# Patient Record
Sex: MALE | Race: WHITE | NOT HISPANIC OR LATINO | Employment: FULL TIME | URBAN - NONMETROPOLITAN AREA
[De-identification: names, ages, dates, MRNs, and addresses within clinical notes are randomized per-mention and may not be internally consistent; named-entity substitution may affect disease eponyms.]

---

## 2023-03-23 DIAGNOSIS — I10 BENIGN ESSENTIAL HYPERTENSION: ICD-10-CM

## 2023-03-23 PROBLEM — G47.33 OSA ON CPAP: Status: ACTIVE | Noted: 2023-03-23

## 2023-03-23 PROBLEM — E78.5 HYPERLIPIDEMIA: Status: ACTIVE | Noted: 2023-03-23

## 2023-03-23 PROBLEM — S49.90XA SHOULDER INJURY: Status: ACTIVE | Noted: 2023-03-23

## 2023-03-23 PROBLEM — N52.9 ERECTILE DYSFUNCTION: Status: ACTIVE | Noted: 2023-03-23

## 2023-03-23 PROBLEM — E29.1 TESTICULAR HYPOFUNCTION: Status: ACTIVE | Noted: 2023-03-23

## 2023-03-23 PROBLEM — F41.9 ANXIETY: Status: ACTIVE | Noted: 2023-03-23

## 2023-03-23 PROBLEM — M25.511 ACUTE PAIN OF RIGHT SHOULDER: Status: ACTIVE | Noted: 2023-03-23

## 2023-03-23 RX ORDER — METOPROLOL SUCCINATE 100 MG/1
100 TABLET, EXTENDED RELEASE ORAL DAILY
COMMUNITY
End: 2023-03-23 | Stop reason: SDUPTHER

## 2023-03-23 RX ORDER — METOPROLOL SUCCINATE 100 MG/1
TABLET, EXTENDED RELEASE ORAL
Qty: 90 TABLET | Refills: 3 | Status: SHIPPED | OUTPATIENT
Start: 2023-03-23 | End: 2023-03-23 | Stop reason: SDUPTHER

## 2023-03-23 RX ORDER — METOPROLOL SUCCINATE 100 MG/1
100 TABLET, EXTENDED RELEASE ORAL DAILY
Qty: 90 TABLET | Refills: 3 | Status: SHIPPED | OUTPATIENT
Start: 2023-03-23 | End: 2024-02-13 | Stop reason: SDUPTHER

## 2023-07-07 RX ORDER — CIPROFLOXACIN 500 MG/1
TABLET ORAL
COMMUNITY
Start: 2023-01-03

## 2023-07-07 RX ORDER — TESTOSTERONE 100 MG
PELLET (EA) IMPLANTATION
COMMUNITY

## 2023-07-07 RX ORDER — ALPRAZOLAM 0.5 MG/1
TABLET ORAL
COMMUNITY

## 2023-07-07 RX ORDER — ANASTROZOLE 1 MG/1
TABLET ORAL
COMMUNITY
Start: 2015-07-10

## 2023-07-07 RX ORDER — AMLODIPINE BESYLATE 5 MG/1
5 TABLET ORAL DAILY
COMMUNITY
End: 2023-07-11

## 2023-07-07 RX ORDER — TADALAFIL 10 MG/1
TABLET ORAL
COMMUNITY
End: 2023-08-22 | Stop reason: SDUPTHER

## 2023-07-07 RX ORDER — LISINOPRIL 40 MG/1
40 TABLET ORAL DAILY
COMMUNITY
End: 2023-09-19

## 2023-07-11 ENCOUNTER — OFFICE VISIT (OUTPATIENT)
Dept: PRIMARY CARE | Facility: CLINIC | Age: 58
End: 2023-07-11
Payer: COMMERCIAL

## 2023-07-11 VITALS
OXYGEN SATURATION: 97 % | BODY MASS INDEX: 29.34 KG/M2 | WEIGHT: 204.5 LBS | RESPIRATION RATE: 14 BRPM | HEART RATE: 88 BPM | TEMPERATURE: 98.3 F | SYSTOLIC BLOOD PRESSURE: 113 MMHG | DIASTOLIC BLOOD PRESSURE: 73 MMHG

## 2023-07-11 DIAGNOSIS — E78.5 HYPERLIPIDEMIA, UNSPECIFIED HYPERLIPIDEMIA TYPE: ICD-10-CM

## 2023-07-11 DIAGNOSIS — F41.9 ANXIETY: ICD-10-CM

## 2023-07-11 DIAGNOSIS — I10 BENIGN ESSENTIAL HYPERTENSION: Primary | ICD-10-CM

## 2023-07-11 DIAGNOSIS — R73.02 IMPAIRED GLUCOSE TOLERANCE: ICD-10-CM

## 2023-07-11 DIAGNOSIS — G47.33 OSA ON CPAP: ICD-10-CM

## 2023-07-11 DIAGNOSIS — E29.1 TESTICULAR HYPOFUNCTION: ICD-10-CM

## 2023-07-11 PROCEDURE — 3074F SYST BP LT 130 MM HG: CPT | Performed by: FAMILY MEDICINE

## 2023-07-11 PROCEDURE — 99214 OFFICE O/P EST MOD 30 MIN: CPT | Performed by: FAMILY MEDICINE

## 2023-07-11 PROCEDURE — 1036F TOBACCO NON-USER: CPT | Performed by: FAMILY MEDICINE

## 2023-07-11 PROCEDURE — 3078F DIAST BP <80 MM HG: CPT | Performed by: FAMILY MEDICINE

## 2023-07-11 RX ORDER — AMLODIPINE BESYLATE 10 MG/1
10 TABLET ORAL DAILY
Qty: 90 TABLET | Refills: 3 | Status: SHIPPED | OUTPATIENT
Start: 2023-07-11 | End: 2024-02-13 | Stop reason: SDUPTHER

## 2023-07-11 ASSESSMENT — ENCOUNTER SYMPTOMS
SWEATS: 0
HYPERTENSION: 1
PALPITATIONS: 0
SHORTNESS OF BREATH: 0
BLURRED VISION: 0
HEADACHES: 0
ORTHOPNEA: 0
NECK PAIN: 0
PND: 0

## 2023-07-11 ASSESSMENT — PATIENT HEALTH QUESTIONNAIRE - PHQ9
1. LITTLE INTEREST OR PLEASURE IN DOING THINGS: NOT AT ALL
2. FEELING DOWN, DEPRESSED OR HOPELESS: NOT AT ALL
SUM OF ALL RESPONSES TO PHQ9 QUESTIONS 1 AND 2: 0

## 2023-07-11 ASSESSMENT — PAIN SCALES - GENERAL: PAINLEVEL: 0-NO PAIN

## 2023-07-11 NOTE — PROGRESS NOTES
Answers submitted by the patient for this visit:  High Blood Pressure Questionnaire (Submitted on 7/11/2023)  Chief Complaint: Hypertension  Chronicity: recurrent  Onset: more than 1 year ago  Progression since onset: unchanged  Condition status: controlled  anxiety: No  blurred vision: No  chest pain: No  headaches: No  malaise/fatigue: No  neck pain: No  orthopnea: No  palpitations: No  peripheral edema: No  PND: No  shortness of breath: No  sweats: No  CAD risks: family history  Compliance problems: diet

## 2023-07-11 NOTE — PROGRESS NOTES
Subjective   Patient ID: Norberto Urbina is a 58 y.o. male who presents for Hypertension.    HPI   Amrik was seen for a routine follow-up of his hypertension, for which he takes metoprolol, lisinopril, amlodipine.  He brought a list of home blood pressures, which range from 120 systolic, 70s to upper 80s diastolic.  Averages in the 130s, 80-85 diastolic.  Medication(s) are being taken and tolerated as prescribed, without concerns, list reconciled today.  There are no complaints of chest pain, shortness of breath, lower extremity edema, or exertional concerns  Labs from his wellness physician will be scanned into the chart, total cholesterol is 183, HDL 38, , triglycerides normal.  Fasting sugar is 116, has been inching up over the last several years.  PSA is normal, as are his blood counts, kidney and liver function.  He overall feels well, has been as of late.  Home weight is around 195.  Review of Systems  The full, 10+ multi-organ review of systems, is within normal limits with the exception of what is noted above in HPI.  Objective   /73 (BP Location: Right arm, Patient Position: Sitting, BP Cuff Size: Adult)   Pulse 88   Temp 36.8 °C (98.3 °F) (Temporal)   Resp 14   Wt 92.8 kg (204 lb 8 oz)   SpO2 97%   BMI 29.34 kg/m²     Physical Exam  Constitutional/General appearance: alert, oriented, well-appearing, in no distress  Head and face exam is normal  No scleral icterus or conjunctival erythema present  Hearing is grossly normal  Respiratory effort is normal, no dyspnea noted  Cortical function is normal  Mood, affect, are pleasant, appropriate, and interactive.  Insight is normal  Cardiac exam reveals a regular rate and rhythm, no murmurs, rubs or gallops present.   Lungs are clear bilaterally.    No lower extremity edema present.    Assessment/Plan     Hypertension, would like slightly better control, given impaired glucose tolerance, mild hyperlipidemia, trace calcium on coronary artery CT  scan.  Continue metoprolol, lisinopril, increase amlodipine to 10 mg daily, watch for fluid retention side effects.    Continue checking labs every 6 months, per his wellness physician, bring copies of it and blood pressures as always, much appreciated.    Continue all other medications, including tadalafil, anastrozole, Testopel.  He takes Xanax extremely infrequently, CSA, UDS not needed.    Follow-up in 6 months

## 2023-08-22 DIAGNOSIS — N52.8 OTHER MALE ERECTILE DYSFUNCTION: ICD-10-CM

## 2023-08-22 RX ORDER — TADALAFIL 10 MG/1
TABLET ORAL
Qty: 10 TABLET | Refills: 2 | Status: SHIPPED | OUTPATIENT
Start: 2023-08-22 | End: 2024-02-22 | Stop reason: SDUPTHER

## 2023-09-18 DIAGNOSIS — I10 BENIGN ESSENTIAL HYPERTENSION: ICD-10-CM

## 2023-09-19 RX ORDER — LISINOPRIL 40 MG/1
40 TABLET ORAL DAILY
Qty: 90 TABLET | Refills: 1 | Status: SHIPPED | OUTPATIENT
Start: 2023-09-19 | End: 2024-02-13 | Stop reason: SDUPTHER

## 2024-01-11 ENCOUNTER — APPOINTMENT (OUTPATIENT)
Dept: PRIMARY CARE | Facility: CLINIC | Age: 59
End: 2024-01-11
Payer: COMMERCIAL

## 2024-01-18 ENCOUNTER — TELEPHONE (OUTPATIENT)
Dept: PRIMARY CARE | Facility: CLINIC | Age: 59
End: 2024-01-18

## 2024-02-13 ENCOUNTER — TELEPHONE (OUTPATIENT)
Dept: PRIMARY CARE | Facility: CLINIC | Age: 59
End: 2024-02-13
Payer: COMMERCIAL

## 2024-02-13 DIAGNOSIS — I10 BENIGN ESSENTIAL HYPERTENSION: ICD-10-CM

## 2024-02-13 DIAGNOSIS — N52.8 OTHER MALE ERECTILE DYSFUNCTION: ICD-10-CM

## 2024-02-13 RX ORDER — AMLODIPINE BESYLATE 10 MG/1
10 TABLET ORAL DAILY
Qty: 5 TABLET | Refills: 0 | Status: SHIPPED | OUTPATIENT
Start: 2024-02-13 | End: 2024-03-06 | Stop reason: SDUPTHER

## 2024-02-13 RX ORDER — LISINOPRIL 40 MG/1
40 TABLET ORAL DAILY
Qty: 5 TABLET | Refills: 0 | Status: SHIPPED | OUTPATIENT
Start: 2024-02-13 | End: 2024-03-06 | Stop reason: SDUPTHER

## 2024-02-13 RX ORDER — METOPROLOL SUCCINATE 100 MG/1
100 TABLET, EXTENDED RELEASE ORAL DAILY
Qty: 5 TABLET | Refills: 0 | Status: SHIPPED | OUTPATIENT
Start: 2024-02-13 | End: 2024-03-25

## 2024-02-13 NOTE — TELEPHONE ENCOUNTER
Pt called in on the refill line stating that he is out of town, and has forgotten his bp meds at home. Pt states that he would like a quantity of #5 of each of his bp meds to be sent to [facility] if able- pharmacy added to chart. Pt bp meds are lisinopril 40mg, metoprolol 100mg, and amlodipine 10mg. Please advise.

## 2024-02-23 RX ORDER — TADALAFIL 10 MG/1
TABLET ORAL
Qty: 10 TABLET | Refills: 2 | Status: SHIPPED | OUTPATIENT
Start: 2024-02-23

## 2024-03-06 ENCOUNTER — TELEPHONE (OUTPATIENT)
Dept: PRIMARY CARE | Facility: CLINIC | Age: 59
End: 2024-03-06

## 2024-03-06 DIAGNOSIS — I10 BENIGN ESSENTIAL HYPERTENSION: ICD-10-CM

## 2024-03-06 RX ORDER — AMLODIPINE BESYLATE 10 MG/1
10 TABLET ORAL DAILY
Qty: 90 TABLET | Refills: 3 | Status: SHIPPED | OUTPATIENT
Start: 2024-03-06 | End: 2025-03-01

## 2024-03-06 RX ORDER — LISINOPRIL 40 MG/1
40 TABLET ORAL DAILY
Qty: 90 TABLET | Refills: 3 | Status: SHIPPED | OUTPATIENT
Start: 2024-03-06 | End: 2025-03-06

## 2024-03-06 NOTE — TELEPHONE ENCOUNTER
Pt called through the answering service. Pt needs a med refill. Pt needs a refill on Amlodipine 10mg and Lisinopril 40mg. Pt only has 1 pill left and is going out of town. Pt uses [facility] in [place]. Pt's next ov 6/25/24.

## 2024-03-24 DIAGNOSIS — I10 BENIGN ESSENTIAL HYPERTENSION: ICD-10-CM

## 2024-03-25 RX ORDER — METOPROLOL SUCCINATE 100 MG/1
100 TABLET, EXTENDED RELEASE ORAL DAILY
Qty: 90 TABLET | Refills: 3 | Status: SHIPPED | OUTPATIENT
Start: 2024-03-25

## 2024-06-25 ENCOUNTER — APPOINTMENT (OUTPATIENT)
Dept: PRIMARY CARE | Facility: CLINIC | Age: 59
End: 2024-06-25
Payer: COMMERCIAL

## 2024-06-25 VITALS
HEART RATE: 71 BPM | DIASTOLIC BLOOD PRESSURE: 74 MMHG | TEMPERATURE: 97.9 F | OXYGEN SATURATION: 98 % | SYSTOLIC BLOOD PRESSURE: 118 MMHG | WEIGHT: 191 LBS | BODY MASS INDEX: 27.41 KG/M2

## 2024-06-25 DIAGNOSIS — Z91.038 HISTORY OF INSECT STING ALLERGY: Primary | ICD-10-CM

## 2024-06-25 DIAGNOSIS — N52.8 OTHER MALE ERECTILE DYSFUNCTION: ICD-10-CM

## 2024-06-25 DIAGNOSIS — E29.1 TESTICULAR HYPOFUNCTION: ICD-10-CM

## 2024-06-25 DIAGNOSIS — G47.33 OSA ON CPAP: ICD-10-CM

## 2024-06-25 DIAGNOSIS — I10 BENIGN ESSENTIAL HYPERTENSION: ICD-10-CM

## 2024-06-25 PROCEDURE — 1036F TOBACCO NON-USER: CPT | Performed by: FAMILY MEDICINE

## 2024-06-25 PROCEDURE — 99214 OFFICE O/P EST MOD 30 MIN: CPT | Performed by: FAMILY MEDICINE

## 2024-06-25 PROCEDURE — 3074F SYST BP LT 130 MM HG: CPT | Performed by: FAMILY MEDICINE

## 2024-06-25 PROCEDURE — 3078F DIAST BP <80 MM HG: CPT | Performed by: FAMILY MEDICINE

## 2024-06-25 RX ORDER — TADALAFIL 10 MG/1
TABLET ORAL
Qty: 30 TABLET | Refills: 2 | Status: SHIPPED | OUTPATIENT
Start: 2024-06-25

## 2024-06-25 RX ORDER — PREDNISONE 20 MG/1
40 TABLET ORAL DAILY
Qty: 10 TABLET | Refills: 0 | Status: SHIPPED | OUTPATIENT
Start: 2024-06-25 | End: 2024-06-30

## 2024-06-25 ASSESSMENT — ENCOUNTER SYMPTOMS
SHORTNESS OF BREATH: 0
ORTHOPNEA: 0
HEADACHES: 0
HYPERTENSION: 1
PALPITATIONS: 0
SWEATS: 0
BLURRED VISION: 0
PND: 0
NECK PAIN: 0

## 2024-06-25 NOTE — PROGRESS NOTES
Subjective   Patient ID: Norberto Urbina is a 59 y.o. male who presents for Follow-up (Pt is here to follow up on HTN. Pt states he has no new concerns to discuss today,. ).    HPI   Amrik was seen today for a routine follow-up of his hypertension, for which he takes amlodipine, metoprolol, lisinopril.  He has been stable on this regimen for quite some time, brought an extensive list of blood pressure years, average is well within the normal range.    He has been  on compounded semaglutide since August or so, lost about 20 pounds, has been steady since, he is comfortable where he is now.  Amrik is due for a colonoscopy, last one 2017 at the [facility] [place] [facility].  He will get an executive physical soon at the [facility].  Labs from November are scanned into the chart under media, all reassuring, including sugar, kidney and liver function.  He continues Testopel and anastrozole.    He has a home in [place], and has been stung on 2 occasions by an unknown insect, leading to a localized allergic reaction with swelling, warmth, mild itching.  In a local emergency room there he was prescribed steroids, antibiotics, Benadryl recommended as well.  The second stating he had some leftover prednisone and after just a dose or 2 symptoms essentially resolved.  Review of Systems  The full, 10+ multi-organ review of systems, is within normal limits with the exception of what is noted above in HPI.  Objective   /74 (BP Location: Right arm, Patient Position: Sitting, BP Cuff Size: Adult)   Pulse 71   Temp 36.6 °C (97.9 °F) (Temporal)   Wt 86.6 kg (191 lb)   SpO2 98%   BMI 27.41 kg/m²     Physical Exam  Constitutional/General appearance: alert, oriented, well-appearing, in no distress  Head and face exam is normal  No scleral icterus or conjunctival erythema present  Hearing is grossly normal  Respiratory effort is normal, no dyspnea noted  Cortical function is normal  Mood, affect, are pleasant, appropriate,  and interactive.  Insight is normal  Cardiac exam reveals a regular rate and rhythm, no murmurs, rubs or gallops present.   Lungs are clear bilaterally.    No lower extremity edema present.    Assessment/Plan     Hypertension, stable on amlodipine, lisinopril, metoprolol, refills are up-to-date for now.    Tadalafil refilled, request good Rx    Testosterone, semaglutide, Arimidex per his alternative medicine physician.    Janiya Ramos discussed  History of insect bites with local allergic reaction, as noted in HPI, I prescribed a small amount of prednisone just to keep on hand.  Colonoscopy due, he will call [facility] to schedule    Follow-up in 1 year  **Portions of this medical record have been created using voice recognition software and may have minor errors which are inherent in voice recognition systems. It has not been fully edited for typographical or grammatical errors**

## 2025-01-07 ENCOUNTER — TELEPHONE (OUTPATIENT)
Dept: PRIMARY CARE | Facility: CLINIC | Age: 60
End: 2025-01-07
Payer: COMMERCIAL

## 2025-01-07 DIAGNOSIS — I10 BENIGN ESSENTIAL HYPERTENSION: ICD-10-CM

## 2025-01-07 RX ORDER — LISINOPRIL 40 MG/1
40 TABLET ORAL DAILY
Qty: 90 TABLET | Refills: 3 | Status: SHIPPED | OUTPATIENT
Start: 2025-01-07 | End: 2026-01-07

## 2025-01-07 RX ORDER — METOPROLOL SUCCINATE 100 MG/1
100 TABLET, EXTENDED RELEASE ORAL DAILY
Qty: 90 TABLET | Refills: 3 | Status: SHIPPED | OUTPATIENT
Start: 2025-01-07

## 2025-01-07 RX ORDER — AMLODIPINE BESYLATE 10 MG/1
10 TABLET ORAL DAILY
Qty: 90 TABLET | Refills: 3 | Status: SHIPPED | OUTPATIENT
Start: 2025-01-07 | End: 2026-01-02

## 2025-01-07 NOTE — TELEPHONE ENCOUNTER
Patient called and scheduled his appt with Dr. Pelletier but needs a refill on hi ABHINAV meds prior to.  Can you please submit?

## 2025-01-21 ENCOUNTER — TELEPHONE (OUTPATIENT)
Dept: PRIMARY CARE | Facility: CLINIC | Age: 60
End: 2025-01-21
Payer: COMMERCIAL

## 2025-01-21 DIAGNOSIS — I10 BENIGN ESSENTIAL HYPERTENSION: ICD-10-CM

## 2025-01-21 RX ORDER — AMLODIPINE BESYLATE 10 MG/1
10 TABLET ORAL DAILY
Qty: 90 TABLET | Refills: 0 | Status: SHIPPED | OUTPATIENT
Start: 2025-01-21

## 2025-01-21 RX ORDER — LISINOPRIL 40 MG/1
40 TABLET ORAL DAILY
Qty: 90 TABLET | Refills: 0 | Status: SHIPPED | OUTPATIENT
Start: 2025-01-21

## 2025-01-21 RX ORDER — METOPROLOL SUCCINATE 100 MG/1
100 TABLET, EXTENDED RELEASE ORAL DAILY
Qty: 90 TABLET | Refills: 0 | Status: SHIPPED | OUTPATIENT
Start: 2025-01-21

## 2025-01-21 NOTE — TELEPHONE ENCOUNTER
Can you call patient back and see what pharmacy it needs sent to?    Lurdes Pelletier DO, MSEd  [facility]  Office: [phone]  1/21/2025 4:22 PM

## 2025-01-21 NOTE — TELEPHONE ENCOUNTER
Pt called stating he's currently in [place] but needs refill on medication and wants to know if they can be sent to a pharmacy near him in [place]? Because he won't be coming straight back to [place] after [place], will be going straight to [facility] after [place].     lisinopril 40 mg tablet     amLODIPine (Norvasc) 10 mg tablet    metoprolol succinate XL (Toprol-XL) 100 mg 24 hr tablet

## 2025-02-01 DIAGNOSIS — I10 BENIGN ESSENTIAL HYPERTENSION: ICD-10-CM

## 2025-02-03 RX ORDER — LISINOPRIL 40 MG/1
40 TABLET ORAL DAILY
Qty: 90 TABLET | Refills: 1 | Status: SHIPPED | OUTPATIENT
Start: 2025-02-03

## 2025-02-03 RX ORDER — METOPROLOL SUCCINATE 100 MG/1
100 TABLET, EXTENDED RELEASE ORAL DAILY
Qty: 90 TABLET | Refills: 1 | Status: SHIPPED | OUTPATIENT
Start: 2025-02-03

## 2025-02-03 RX ORDER — AMLODIPINE BESYLATE 10 MG/1
10 TABLET ORAL DAILY
Qty: 90 TABLET | Refills: 1 | Status: SHIPPED | OUTPATIENT
Start: 2025-02-03

## 2025-02-04 NOTE — TELEPHONE ENCOUNTER
BRIEF NOTE    Subjective/Objective:  Pharmacy refill request for amlodipine, lisinopril, Toprol XL. Patient just had 90 day supply sent to [place] pharmacy.     Assessment/Plan:  1. Benign essential hypertension  - amLODIPine (Norvasc) 10 mg tablet; Take 1 tablet (10 mg) by mouth once daily.  Dispense: 90 tablet; Refill: 1  - lisinopril 40 mg tablet; Take 1 tablet (40 mg) by mouth once daily.  Dispense: 90 tablet; Refill: 1  - metoprolol succinate XL (Toprol-XL) 100 mg 24 hr tablet; Take 1 tablet (100 mg) by mouth once daily.  Dispense: 90 tablet; Refill: 1      No problem-specific Assessment & Plan notes found for this encounter.        Lurdes Pelletier DO, Brittny  [facility]  Office: [phone]  2/3/2025 9:46 PM

## 2025-04-07 ENCOUNTER — TELEPHONE (OUTPATIENT)
Dept: PRIMARY CARE | Facility: CLINIC | Age: 60
End: 2025-04-07
Payer: COMMERCIAL

## 2025-04-07 DIAGNOSIS — I10 BENIGN ESSENTIAL HYPERTENSION: ICD-10-CM

## 2025-04-07 NOTE — TELEPHONE ENCOUNTER
Pt called in stating he is currently out of town and left his medication metoprolol succinate XL (Toprol-XL) 100 mg 24 hr tablet at home and needs a 4 days supply if possible sent over and if possible send to   [facility] in [place]  ( in chart )

## 2025-04-08 RX ORDER — METOPROLOL SUCCINATE 100 MG/1
100 TABLET, EXTENDED RELEASE ORAL DAILY
Qty: 30 TABLET | Refills: 0 | Status: SHIPPED | OUTPATIENT
Start: 2025-04-08

## 2025-04-08 NOTE — TELEPHONE ENCOUNTER
1. Benign essential hypertension  - metoprolol succinate XL (Toprol-XL) 100 mg 24 hr tablet; Take 1 tablet (100 mg) by mouth once daily.  Dispense: 30 tablet; Refill: 0      Lurdes Pelletier DO, MSEd  [facility]  Office: [phone]  4/8/2025 8:08 AM

## 2025-06-02 ENCOUNTER — PATIENT MESSAGE (OUTPATIENT)
Dept: PRIMARY CARE | Facility: CLINIC | Age: 60
End: 2025-06-02
Payer: COMMERCIAL

## 2025-06-03 DIAGNOSIS — I10 BENIGN ESSENTIAL HYPERTENSION: ICD-10-CM

## 2025-06-04 RX ORDER — METOPROLOL SUCCINATE 100 MG/1
100 TABLET, EXTENDED RELEASE ORAL DAILY
Qty: 30 TABLET | Refills: 0 | Status: SHIPPED | OUTPATIENT
Start: 2025-06-04

## 2025-06-04 NOTE — TELEPHONE ENCOUNTER
BRIEF NOTE    Subjective/Objective:  MyChart refill request. Patient out of medication on a trip.     Assessment/Plan:  1. Benign essential hypertension  - metoprolol succinate XL (Toprol-XL) 100 mg 24 hr tablet; Take 1 tablet (100 mg) by mouth once daily.  Dispense: 30 tablet; Refill: 0      No problem-specific Assessment & Plan notes found for this encounter.        Lurdes Pelletier DO, MSEd  [facility]  Office: [phone]  6/4/2025 9:46 AM

## 2025-06-25 ENCOUNTER — OFFICE VISIT (OUTPATIENT)
Dept: PRIMARY CARE | Facility: CLINIC | Age: 60
End: 2025-06-25
Payer: COMMERCIAL

## 2025-06-25 ENCOUNTER — APPOINTMENT (OUTPATIENT)
Dept: PRIMARY CARE | Facility: CLINIC | Age: 60
End: 2025-06-25
Payer: COMMERCIAL

## 2025-06-25 VITALS
HEART RATE: 69 BPM | DIASTOLIC BLOOD PRESSURE: 76 MMHG | RESPIRATION RATE: 14 BRPM | SYSTOLIC BLOOD PRESSURE: 112 MMHG | OXYGEN SATURATION: 95 % | TEMPERATURE: 97.6 F | WEIGHT: 186.9 LBS | BODY MASS INDEX: 26.76 KG/M2 | HEIGHT: 70 IN

## 2025-06-25 DIAGNOSIS — N52.8 OTHER MALE ERECTILE DYSFUNCTION: ICD-10-CM

## 2025-06-25 DIAGNOSIS — I10 BENIGN ESSENTIAL HYPERTENSION: ICD-10-CM

## 2025-06-25 PROCEDURE — 3078F DIAST BP <80 MM HG: CPT | Performed by: STUDENT IN AN ORGANIZED HEALTH CARE EDUCATION/TRAINING PROGRAM

## 2025-06-25 PROCEDURE — 3008F BODY MASS INDEX DOCD: CPT | Performed by: STUDENT IN AN ORGANIZED HEALTH CARE EDUCATION/TRAINING PROGRAM

## 2025-06-25 PROCEDURE — 3074F SYST BP LT 130 MM HG: CPT | Performed by: STUDENT IN AN ORGANIZED HEALTH CARE EDUCATION/TRAINING PROGRAM

## 2025-06-25 PROCEDURE — 1036F TOBACCO NON-USER: CPT | Performed by: STUDENT IN AN ORGANIZED HEALTH CARE EDUCATION/TRAINING PROGRAM

## 2025-06-25 RX ORDER — LISINOPRIL 40 MG/1
40 TABLET ORAL DAILY
Qty: 90 TABLET | Refills: 1 | Status: CANCELLED | OUTPATIENT
Start: 2025-06-25

## 2025-06-25 RX ORDER — TADALAFIL 10 MG/1
TABLET ORAL
Qty: 30 TABLET | Refills: 2 | Status: CANCELLED | OUTPATIENT
Start: 2025-06-25

## 2025-06-25 RX ORDER — AMLODIPINE BESYLATE 10 MG/1
10 TABLET ORAL DAILY
Qty: 90 TABLET | Refills: 1 | Status: CANCELLED | OUTPATIENT
Start: 2025-06-25

## 2025-06-25 RX ORDER — METOPROLOL SUCCINATE 100 MG/1
100 TABLET, EXTENDED RELEASE ORAL DAILY
Qty: 30 TABLET | Refills: 0 | Status: CANCELLED | OUTPATIENT
Start: 2025-06-25

## 2025-06-25 RX ORDER — ANASTROZOLE 1 MG/1
TABLET ORAL
Status: CANCELLED | OUTPATIENT
Start: 2025-06-25

## 2025-06-25 ASSESSMENT — ANXIETY QUESTIONNAIRES
5. BEING SO RESTLESS THAT IT IS HARD TO SIT STILL: NOT AT ALL
1. FEELING NERVOUS, ANXIOUS, OR ON EDGE: NOT AT ALL
4. TROUBLE RELAXING: NOT AT ALL
2. NOT BEING ABLE TO STOP OR CONTROL WORRYING: NOT AT ALL
IF YOU CHECKED OFF ANY PROBLEMS ON THIS QUESTIONNAIRE, HOW DIFFICULT HAVE THESE PROBLEMS MADE IT FOR YOU TO DO YOUR WORK, TAKE CARE OF THINGS AT HOME, OR GET ALONG WITH OTHER PEOPLE: NOT DIFFICULT AT ALL
GAD7 TOTAL SCORE: 0
3. WORRYING TOO MUCH ABOUT DIFFERENT THINGS: NOT AT ALL
6. BECOMING EASILY ANNOYED OR IRRITABLE: NOT AT ALL
7. FEELING AFRAID AS IF SOMETHING AWFUL MIGHT HAPPEN: NOT AT ALL

## 2025-06-25 ASSESSMENT — PATIENT HEALTH QUESTIONNAIRE - PHQ9
8. MOVING OR SPEAKING SO SLOWLY THAT OTHER PEOPLE COULD HAVE NOTICED. OR THE OPPOSITE, BEING SO FIGETY OR RESTLESS THAT YOU HAVE BEEN MOVING AROUND A LOT MORE THAN USUAL: NOT AT ALL
1. LITTLE INTEREST OR PLEASURE IN DOING THINGS: NOT AT ALL
7. TROUBLE CONCENTRATING ON THINGS, SUCH AS READING THE NEWSPAPER OR WATCHING TELEVISION: NOT AT ALL
5. POOR APPETITE OR OVEREATING: NOT AT ALL
4. FEELING TIRED OR HAVING LITTLE ENERGY: NOT AT ALL
6. FEELING BAD ABOUT YOURSELF - OR THAT YOU ARE A FAILURE OR HAVE LET YOURSELF OR YOUR FAMILY DOWN: NOT AT ALL
SUM OF ALL RESPONSES TO PHQ9 QUESTIONS 1 AND 2: 0
SUM OF ALL RESPONSES TO PHQ QUESTIONS 1-9: 0
2. FEELING DOWN, DEPRESSED OR HOPELESS: NOT AT ALL
9. THOUGHTS THAT YOU WOULD BE BETTER OFF DEAD, OR OF HURTING YOURSELF: NOT AT ALL
3. TROUBLE FALLING OR STAYING ASLEEP OR SLEEPING TOO MUCH: NOT AT ALL

## 2025-06-25 NOTE — PROGRESS NOTES
Patient left without being seen by the provider. No medications will be sent. Patient was requesting the following medications:  - Cialis   - DHEA (Dr. Vega did not manage)   - Anastrazole (Dr. Vega did not manage)   - Metoprolol  - Lisinopril  - Amlodipine    Unclear why patient is on the DHEA and Anastrazole, this looks to be per a prior homeopathic doctor. Last doctor to fill these per dispense report was Bryanna Torres MD. Would recommend he seek their evaluation for these medications.     Patient would need to re-establish at our office since he eloped today.     Lurdes Pelletier DO, Brittny  [facility]   Office: [phone]  6/25/2025 12:28 PM